# Patient Record
Sex: FEMALE | Race: WHITE | NOT HISPANIC OR LATINO
[De-identification: names, ages, dates, MRNs, and addresses within clinical notes are randomized per-mention and may not be internally consistent; named-entity substitution may affect disease eponyms.]

---
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
## 2023-07-13 PROBLEM — Z00.00 ENCOUNTER FOR PREVENTIVE HEALTH EXAMINATION: Status: ACTIVE | Noted: 2023-07-13

## 2023-08-04 ENCOUNTER — APPOINTMENT (OUTPATIENT)
Dept: ENDOCRINOLOGY | Facility: CLINIC | Age: 18
End: 2023-08-04
Payer: COMMERCIAL

## 2023-08-04 ENCOUNTER — NON-APPOINTMENT (OUTPATIENT)
Age: 18
End: 2023-08-04

## 2023-08-04 VITALS
DIASTOLIC BLOOD PRESSURE: 56 MMHG | HEIGHT: 65 IN | HEART RATE: 70 BPM | WEIGHT: 154 LBS | SYSTOLIC BLOOD PRESSURE: 90 MMHG | BODY MASS INDEX: 25.66 KG/M2

## 2023-08-04 PROCEDURE — 99203 OFFICE O/P NEW LOW 30 MIN: CPT

## 2023-08-04 NOTE — PHYSICAL EXAM
[Alert] : alert [No Acute Distress] : no acute distress [No Proptosis] : no proptosis [No Lid Lag] : no lid lag [Normal Hearing] : hearing was normal [No LAD] : no lymphadenopathy [Clear to Auscultation] : lungs were clear to auscultation bilaterally [Normal S1, S2] : normal S1 and S2 [Regular Rhythm] : with a regular rhythm [Normal Affect] : the affect was normal [Normal Mood] : the mood was normal [Acanthosis Nigricans] : no acanthosis nigricans [de-identified] : thyroid smooth

## 2023-08-04 NOTE — HISTORY OF PRESENT ILLNESS
[FreeTextEntry1] : She recently saw allergist for urticaria and was suggested to check her thyroid.  Her paternal aunt has thyroid disease and her sister has (+) TPO antibody. She is having some hair loss; no acne or hirsutism. She gained Freshman [employer] when she went to college, is not eating a heathy diet. Periods regular, usually every 4 weeks.  LMP 6/28/23  Meds Allegra prn

## 2023-08-04 NOTE — ASSESSMENT
[FreeTextEntry1] : Hair loss.  FH of thyroid disease  will check TSH, thyroid antibodies, androgens, ferritin, Hb If results are normal, no treatment is necessary since symptoms (hair loss) are mild. I recommended healthier lifestyle including  diet (less processed carbs), regular physical activity, adequate sleep, stress reduction (if possible). If TPO is elevated but TSH is normal, will recommend yearly monitoring of TSH but levothyroxine replacement not necessary until TSH becomes abnormal/elevated. RTO prn

## 2023-08-07 LAB
ALBUMIN SERPL ELPH-MCNC: 4.3 G/DL
ALP BLD-CCNC: 62 U/L
ALT SERPL-CCNC: 14 U/L
ANION GAP SERPL CALC-SCNC: 12 MMOL/L
AST SERPL-CCNC: 15 U/L
BILIRUB SERPL-MCNC: 0.4 MG/DL
BUN SERPL-MCNC: 14 MG/DL
CALCIUM SERPL-MCNC: 9.5 MG/DL
CHLORIDE SERPL-SCNC: 103 MMOL/L
CO2 SERPL-SCNC: 27 MMOL/L
CREAT SERPL-MCNC: 0.71 MG/DL
DHEA-S SERPL-MCNC: 264 UG/DL
EGFR: 126 ML/MIN/1.73M2
FERRITIN SERPL-MCNC: 52 NG/ML
FSH SERPL-MCNC: 2.5 IU/L
GLUCOSE SERPL-MCNC: 77 MG/DL
HCT VFR BLD CALC: 38.6 %
HGB BLD-MCNC: 12.1 G/DL
LH SERPL-ACNC: 13.2 IU/L
POTASSIUM SERPL-SCNC: 4.4 MMOL/L
PROT SERPL-MCNC: 7.3 G/DL
SODIUM SERPL-SCNC: 141 MMOL/L
TESTOST SERPL-MCNC: 31.9 NG/DL
THYROGLOB AB SERPL-ACNC: <20 IU/ML
THYROPEROXIDASE AB SERPL IA-ACNC: 27.5 IU/ML
TSH SERPL-ACNC: 1.13 UIU/ML

## 2024-05-22 ENCOUNTER — APPOINTMENT (OUTPATIENT)
Dept: RHEUMATOLOGY | Facility: CLINIC | Age: 19
End: 2024-05-22
Payer: COMMERCIAL

## 2024-05-22 VITALS
BODY MASS INDEX: 24.16 KG/M2 | WEIGHT: 145 LBS | HEIGHT: 65 IN | SYSTOLIC BLOOD PRESSURE: 104 MMHG | DIASTOLIC BLOOD PRESSURE: 70 MMHG | OXYGEN SATURATION: 97 % | TEMPERATURE: 97.6 F | HEART RATE: 65 BPM

## 2024-05-22 DIAGNOSIS — L65.9 NONSCARRING HAIR LOSS, UNSPECIFIED: ICD-10-CM

## 2024-05-22 DIAGNOSIS — M25.552 PAIN IN LEFT HIP: ICD-10-CM

## 2024-05-22 PROCEDURE — 99204 OFFICE O/P NEW MOD 45 MIN: CPT

## 2024-05-22 RX ORDER — ESCITALOPRAM OXALATE 20 MG/1
20 TABLET, FILM COATED ORAL
Refills: 0 | Status: ACTIVE | COMMUNITY

## 2024-05-22 NOTE — DATA REVIEWED
[FreeTextEntry1] : 10/29/2023: RF neg ESR 19 CRP 8.8 (<8) CCP neg GIULIANA neg Hep b and c neg CBC: 11.7 hgb 11.9 hct 35.9 plt 321  Xray SI joints normal Xray of the pelvis: shallow acetabula bilaterally compatible with hip dysplasia lumbar spine normal

## 2024-05-22 NOTE — HISTORY OF PRESENT ILLNESS
[FreeTextEntry1] : New Patient 18 year old woman referred for left hip pain Patient experienced first episode of left hip pain about 3 years ago Pain is always in the left hip Woke up from sleep, severe pain, hard to ambulate, also associated with locking and pain with certain movement During episodes, feels painful to walk or do any movements Wenn not having episoode of acute pain symptoms, only feels pain with crossng legs or staying in one position for too long Patient is currently without pain  During most recent episode in October 2023 at school of left hip pain [facility]  Had blood work and xrays completed reviewed on phone: 10/29/2023: RF neg ESR 19 CRP 8.8 (<8) CCP neg GIULIANA neg Hep b and c neg CBC: 11.7 hgb 11.9 hct 35.9 plt 321  Xray SI joints normal Xray of the pelvis: 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

## 2024-05-22 NOTE — ASSESSMENT
[FreeTextEntry1] : 18 year old woman referred for Rheumatology evaluation.  Patient with left hip pain with intermittent flares of joint pain. Pain not worsened by activity or rest, not associated with morning stiffness or joint swelling, but worse with certain position and pain is present in the groin area.  Patient was evaluated by urgent care during most recent flare of hip pain in 10/2023 with RF, CCP, and GIULIANA negative.  Hip xrays suggestive of bilateral shallow acetabula raising the question of possible hip dysplasia.  At this time, hip pain does not appear consistent with an inflammatory arthritis, however, given xray reports of hip dysplasia and persistent symptoms will proceed with MRI of the left hip for further evaluation for internal derangement such as labral tear vs hip dysplasia.

## 2024-05-22 NOTE — PHYSICAL EXAM
[General Appearance - Alert] : alert [General Appearance - In No Acute Distress] : in no acute distress [General Appearance - Well Nourished] : well nourished [General Appearance - Well Developed] : well developed [General Appearance - Well-Appearing] : healthy appearing [Sclera] : the sclera and conjunctiva were normal [Examination Of The Oral Cavity] : the lips and gums were normal [Oropharynx] : the oropharynx was normal [Respiration, Rhythm And Depth] : normal respiratory rhythm and effort [Exaggerated Use Of Accessory Muscles For Inspiration] : no accessory muscle use [No Spinal Tenderness] : no spinal tenderness [Abnormal Walk] : normal gait [Nail Clubbing] : no clubbing  or cyanosis of the fingernails [Musculoskeletal - Swelling] : no joint swelling seen [Motor Tone] : muscle strength and tone were normal [] : no rash [Skin Lesions] : no skin lesions [Oriented To Time, Place, And Person] : oriented to person, place, and time [Impaired Insight] : insight and judgment were intact [Affect] : the affect was normal [Mood] : the mood was normal [Auscultation Breath Sounds / Voice Sounds] : lungs were clear to auscultation bilaterally [FreeTextEntry1] : minimal decrease ROM of the left hip in flexion.  Minimal decrease in external rotation bilateral hips. No active synovitis.

## 2024-06-17 ENCOUNTER — APPOINTMENT (OUTPATIENT)
Dept: MRI IMAGING | Facility: CLINIC | Age: 19
End: 2024-06-17
Payer: COMMERCIAL

## 2024-06-17 PROCEDURE — 73721 MRI JNT OF LWR EXTRE W/O DYE: CPT | Mod: LT

## 2024-06-18 DIAGNOSIS — M12.20 VILLONODULAR SYNOVITIS (PIGMENTED), UNSPECIFIED SITE: ICD-10-CM

## 2024-06-26 ENCOUNTER — OUTPATIENT (OUTPATIENT)
Dept: OUTPATIENT SERVICES | Facility: HOSPITAL | Age: 19
LOS: 1 days | End: 2024-06-26
Payer: COMMERCIAL

## 2024-06-26 PROCEDURE — 73502 X-RAY EXAM HIP UNI 2-3 VIEWS: CPT | Mod: 26,LT

## 2024-06-26 PROCEDURE — 73502 X-RAY EXAM HIP UNI 2-3 VIEWS: CPT

## 2024-06-28 ENCOUNTER — APPOINTMENT (OUTPATIENT)
Dept: ORTHOPEDIC SURGERY | Facility: CLINIC | Age: 19
End: 2024-06-28

## 2024-07-16 ENCOUNTER — APPOINTMENT (OUTPATIENT)
Dept: ORTHOPEDIC SURGERY | Facility: CLINIC | Age: 19
End: 2024-07-16

## 2024-07-16 ENCOUNTER — RESULT REVIEW (OUTPATIENT)
Age: 19
End: 2024-07-16

## 2024-07-16 ENCOUNTER — APPOINTMENT (OUTPATIENT)
Dept: CT IMAGING | Facility: HOSPITAL | Age: 19
End: 2024-07-16
Payer: COMMERCIAL

## 2024-07-16 ENCOUNTER — APPOINTMENT (OUTPATIENT)
Dept: ORTHOPEDIC SURGERY | Facility: CLINIC | Age: 19
End: 2024-07-16
Payer: COMMERCIAL

## 2024-07-16 ENCOUNTER — OUTPATIENT (OUTPATIENT)
Dept: OUTPATIENT SERVICES | Facility: HOSPITAL | Age: 19
LOS: 1 days | End: 2024-07-16
Payer: COMMERCIAL

## 2024-07-16 DIAGNOSIS — R22.42 LOCALIZED SWELLING, MASS AND LUMP, LEFT LOWER LIMB: ICD-10-CM

## 2024-07-16 DIAGNOSIS — M25.552 PAIN IN LEFT HIP: ICD-10-CM

## 2024-07-16 PROCEDURE — 99204 OFFICE O/P NEW MOD 45 MIN: CPT | Mod: 95

## 2024-07-16 PROCEDURE — 77012 CT SCAN FOR NEEDLE BIOPSY: CPT | Mod: 26

## 2024-07-16 PROCEDURE — 20206 BIOPSY MUSCLE PERQ NEEDLE: CPT

## 2024-07-16 PROCEDURE — 77012 CT SCAN FOR NEEDLE BIOPSY: CPT

## 2024-07-16 PROCEDURE — 88307 TISSUE EXAM BY PATHOLOGIST: CPT | Mod: 26

## 2024-07-17 ENCOUNTER — APPOINTMENT (OUTPATIENT)
Age: 19
End: 2024-07-17

## 2024-07-17 LAB
ALBUMIN SERPL ELPH-MCNC: 4.3 G/DL
ALP BLD-CCNC: 67 U/L
ALT SERPL-CCNC: 9 U/L
ANION GAP SERPL CALC-SCNC: 9 MMOL/L
APTT BLD: 34.9 SEC
AST SERPL-CCNC: 17 U/L
BILIRUB SERPL-MCNC: 0.5 MG/DL
BUN SERPL-MCNC: 9 MG/DL
CALCIUM SERPL-MCNC: 9 MG/DL
CHLORIDE SERPL-SCNC: 106 MMOL/L
CO2 SERPL-SCNC: 22 MMOL/L
CREAT SERPL-MCNC: 0.62 MG/DL
EGFR: 131 ML/MIN/1.73M2
GLUCOSE SERPL-MCNC: 97 MG/DL
HCT VFR BLD CALC: 34.7 %
HGB BLD-MCNC: 11.6 G/DL
INR PPP: 0.91
MCHC RBC-ENTMCNC: 29 PG
MCHC RBC-ENTMCNC: 33.4 GM/DL
MCV RBC AUTO: 86.8 FL
PLATELET # BLD AUTO: 274 K/UL
PMV BLD AUTO: 0 /100 WBCS
POTASSIUM SERPL-SCNC: 3.6 MMOL/L
PROT SERPL-MCNC: 7.7 G/DL
PT BLD: 10.4 SEC
RBC # BLD: 4 M/UL
RBC # FLD: 12.9 %
SODIUM SERPL-SCNC: 137 MMOL/L
SURGICAL PATHOLOGY STUDY: SIGNIFICANT CHANGE UP
WBC # FLD AUTO: 6.33 K/UL

## 2024-08-16 ENCOUNTER — APPOINTMENT (OUTPATIENT)
Dept: ENDOCRINOLOGY | Facility: CLINIC | Age: 19
End: 2024-08-16

## 2024-08-28 ENCOUNTER — OUTPATIENT (OUTPATIENT)
Dept: OUTPATIENT SERVICES | Facility: HOSPITAL | Age: 19
LOS: 1 days | End: 2024-08-28
Payer: COMMERCIAL

## 2024-08-28 PROCEDURE — 73501 X-RAY EXAM HIP UNI 1 VIEW: CPT

## 2024-08-28 PROCEDURE — 73501 X-RAY EXAM HIP UNI 1 VIEW: CPT | Mod: 26,LT

## 2024-10-21 RX ORDER — ESCITALOPRAM OXALATE 20 MG/1
20 TABLET ORAL DAILY
Qty: 5 | Refills: 0 | Status: ACTIVE | COMMUNITY
Start: 2024-10-21 | End: 1900-01-01